# Patient Record
Sex: FEMALE | Race: BLACK OR AFRICAN AMERICAN | URBAN - METROPOLITAN AREA
[De-identification: names, ages, dates, MRNs, and addresses within clinical notes are randomized per-mention and may not be internally consistent; named-entity substitution may affect disease eponyms.]

---

## 2017-02-16 ENCOUNTER — OUTPATIENT (OUTPATIENT)
Dept: OUTPATIENT SERVICES | Facility: HOSPITAL | Age: 77
LOS: 1 days | Discharge: HOME | End: 2017-02-16

## 2017-02-16 LAB
ALBUMIN SERPL ELPH-MCNC: 3.9 G/DL — SIGNIFICANT CHANGE UP (ref 3.3–5)
ALP SERPL-CCNC: 100 U/L — SIGNIFICANT CHANGE UP (ref 40–120)
ALT FLD-CCNC: 21 U/L — SIGNIFICANT CHANGE UP (ref 4–33)
APPEARANCE UR: CLEAR — SIGNIFICANT CHANGE UP
AST SERPL-CCNC: 59 U/L — HIGH (ref 4–32)
BASOPHILS # BLD AUTO: 0.06 K/UL — SIGNIFICANT CHANGE UP (ref 0–0.2)
BASOPHILS NFR BLD AUTO: 1.8 % — SIGNIFICANT CHANGE UP (ref 0–2)
BILIRUB SERPL-MCNC: 0.2 MG/DL — SIGNIFICANT CHANGE UP (ref 0.2–1.2)
BILIRUB UR-MCNC: NEGATIVE — SIGNIFICANT CHANGE UP
BLOOD UR QL VISUAL: NEGATIVE — SIGNIFICANT CHANGE UP
BUN SERPL-MCNC: 10 MG/DL — SIGNIFICANT CHANGE UP (ref 7–23)
CALCIUM SERPL-MCNC: 9.1 MG/DL — SIGNIFICANT CHANGE UP (ref 8.4–10.5)
CHLORIDE SERPL-SCNC: 102 MMOL/L — SIGNIFICANT CHANGE UP (ref 98–107)
CHOLEST SERPL-MCNC: 215 MG/DL — HIGH (ref 120–199)
CO2 SERPL-SCNC: 24 MMOL/L — SIGNIFICANT CHANGE UP (ref 22–31)
COLOR SPEC: YELLOW — SIGNIFICANT CHANGE UP
CREAT SERPL-MCNC: 0.68 MG/DL — SIGNIFICANT CHANGE UP (ref 0.5–1.3)
EOSINOPHIL # BLD AUTO: 0.23 K/UL — SIGNIFICANT CHANGE UP (ref 0–0.5)
EOSINOPHIL NFR BLD AUTO: 6.9 % — HIGH (ref 0–6)
FOLATE SERPL-MCNC: > 20 NG/ML — HIGH (ref 4.7–20)
GLUCOSE SERPL-MCNC: 79 MG/DL — SIGNIFICANT CHANGE UP (ref 70–99)
GLUCOSE UR-MCNC: NEGATIVE — SIGNIFICANT CHANGE UP
HCT VFR BLD CALC: 34.3 % — LOW (ref 34.5–45)
HDLC SERPL-MCNC: 182 MG/DL — HIGH (ref 45–65)
HGB BLD-MCNC: 11.1 G/DL — LOW (ref 11.5–15.5)
IMM GRANULOCYTES NFR BLD AUTO: 0 % — SIGNIFICANT CHANGE UP (ref 0–1.5)
KETONES UR-MCNC: NEGATIVE — SIGNIFICANT CHANGE UP
LDH SERPL L TO P-CCNC: 238 U/L — HIGH (ref 135–225)
LEUKOCYTE ESTERASE UR-ACNC: NEGATIVE — SIGNIFICANT CHANGE UP
LYMPHOCYTES # BLD AUTO: 1.18 K/UL — SIGNIFICANT CHANGE UP (ref 1–3.3)
LYMPHOCYTES # BLD AUTO: 35.5 % — SIGNIFICANT CHANGE UP (ref 13–44)
MCHC RBC-ENTMCNC: 30.1 PG — SIGNIFICANT CHANGE UP (ref 27–34)
MCHC RBC-ENTMCNC: 32.4 % — SIGNIFICANT CHANGE UP (ref 32–36)
MCV RBC AUTO: 93 FL — SIGNIFICANT CHANGE UP (ref 80–100)
MONOCYTES # BLD AUTO: 0.36 K/UL — SIGNIFICANT CHANGE UP (ref 0–0.9)
MONOCYTES NFR BLD AUTO: 10.8 % — SIGNIFICANT CHANGE UP (ref 2–14)
MUCOUS THREADS # UR AUTO: SIGNIFICANT CHANGE UP
NEUTROPHILS # BLD AUTO: 1.49 K/UL — LOW (ref 1.8–7.4)
NEUTROPHILS NFR BLD AUTO: 45 % — SIGNIFICANT CHANGE UP (ref 43–77)
NITRITE UR-MCNC: NEGATIVE — SIGNIFICANT CHANGE UP
PH UR: 7.5 — SIGNIFICANT CHANGE UP (ref 4.6–8)
PLATELET # BLD AUTO: 254 K/UL — SIGNIFICANT CHANGE UP (ref 150–400)
PMV BLD: 10.6 FL — SIGNIFICANT CHANGE UP (ref 7–13)
POTASSIUM SERPL-MCNC: 4.4 MMOL/L — SIGNIFICANT CHANGE UP (ref 3.5–5.3)
POTASSIUM SERPL-SCNC: 4.4 MMOL/L — SIGNIFICANT CHANGE UP (ref 3.5–5.3)
PROT SERPL-MCNC: 6.5 G/DL — SIGNIFICANT CHANGE UP (ref 6–8.3)
PROT UR-MCNC: 10 — SIGNIFICANT CHANGE UP
RBC # BLD: 3.69 M/UL — LOW (ref 3.8–5.2)
RBC # FLD: 14.1 % — SIGNIFICANT CHANGE UP (ref 10.3–14.5)
RBC CASTS # UR COMP ASSIST: SIGNIFICANT CHANGE UP (ref 0–?)
SODIUM SERPL-SCNC: 138 MMOL/L — SIGNIFICANT CHANGE UP (ref 135–145)
SP GR SPEC: 1.02 — SIGNIFICANT CHANGE UP (ref 1–1.03)
SQUAMOUS # UR AUTO: SIGNIFICANT CHANGE UP
TRIGL SERPL-MCNC: 56 MG/DL — SIGNIFICANT CHANGE UP (ref 10–149)
TSH SERPL-MCNC: 0.69 UIU/ML — SIGNIFICANT CHANGE UP (ref 0.27–4.2)
UROBILINOGEN FLD QL: NORMAL E.U. — SIGNIFICANT CHANGE UP (ref 0.1–0.2)
VIT B12 SERPL-MCNC: 605 PG/ML — SIGNIFICANT CHANGE UP (ref 200–900)
WBC # BLD: 3.32 K/UL — LOW (ref 3.8–10.5)
WBC # FLD AUTO: 3.32 K/UL — LOW (ref 3.8–10.5)
WBC UR QL: SIGNIFICANT CHANGE UP (ref 0–?)

## 2017-02-17 DIAGNOSIS — F33.9 MAJOR DEPRESSIVE DISORDER, RECURRENT, UNSPECIFIED: ICD-10-CM

## 2017-05-25 ENCOUNTER — EMERGENCY (EMERGENCY)
Facility: HOSPITAL | Age: 77
LOS: 1 days | Discharge: HOME | End: 2017-05-25
Attending: EMERGENCY MEDICINE | Admitting: EMERGENCY MEDICINE
Payer: MEDICARE

## 2017-05-25 VITALS
HEART RATE: 67 BPM | TEMPERATURE: 98 F | OXYGEN SATURATION: 99 % | RESPIRATION RATE: 16 BRPM | DIASTOLIC BLOOD PRESSURE: 56 MMHG | SYSTOLIC BLOOD PRESSURE: 124 MMHG

## 2017-05-25 VITALS
DIASTOLIC BLOOD PRESSURE: 47 MMHG | SYSTOLIC BLOOD PRESSURE: 120 MMHG | TEMPERATURE: 98 F | RESPIRATION RATE: 16 BRPM | HEART RATE: 66 BPM | OXYGEN SATURATION: 100 %

## 2017-05-25 DIAGNOSIS — F33.9 MAJOR DEPRESSIVE DISORDER, RECURRENT, UNSPECIFIED: ICD-10-CM

## 2017-05-25 LAB
ALBUMIN SERPL ELPH-MCNC: 4.3 G/DL — SIGNIFICANT CHANGE UP (ref 3.3–5)
ALP SERPL-CCNC: 113 U/L — SIGNIFICANT CHANGE UP (ref 40–120)
ALT FLD-CCNC: 22 U/L — SIGNIFICANT CHANGE UP (ref 4–33)
AMPHET UR-MCNC: NEGATIVE — SIGNIFICANT CHANGE UP
APAP SERPL-MCNC: < 15 UG/ML — LOW (ref 15–25)
APPEARANCE UR: CLEAR — SIGNIFICANT CHANGE UP
AST SERPL-CCNC: 57 U/L — HIGH (ref 4–32)
BARBITURATES MEASUREMENT: NEGATIVE — SIGNIFICANT CHANGE UP
BARBITURATES UR SCN-MCNC: NEGATIVE — SIGNIFICANT CHANGE UP
BASOPHILS # BLD AUTO: 0.06 K/UL — SIGNIFICANT CHANGE UP (ref 0–0.2)
BASOPHILS NFR BLD AUTO: 1.1 % — SIGNIFICANT CHANGE UP (ref 0–2)
BENZODIAZ SERPL-MCNC: NEGATIVE — SIGNIFICANT CHANGE UP
BENZODIAZ UR-MCNC: NEGATIVE — SIGNIFICANT CHANGE UP
BILIRUB SERPL-MCNC: 0.2 MG/DL — SIGNIFICANT CHANGE UP (ref 0.2–1.2)
BILIRUB UR-MCNC: NEGATIVE — SIGNIFICANT CHANGE UP
BLOOD UR QL VISUAL: NEGATIVE — SIGNIFICANT CHANGE UP
BUN SERPL-MCNC: 22 MG/DL — SIGNIFICANT CHANGE UP (ref 7–23)
CALCIUM SERPL-MCNC: 9.9 MG/DL — SIGNIFICANT CHANGE UP (ref 8.4–10.5)
CANNABINOIDS UR-MCNC: NEGATIVE — SIGNIFICANT CHANGE UP
CHLORIDE SERPL-SCNC: 102 MMOL/L — SIGNIFICANT CHANGE UP (ref 98–107)
CO2 SERPL-SCNC: 24 MMOL/L — SIGNIFICANT CHANGE UP (ref 22–31)
COCAINE METAB.OTHER UR-MCNC: NEGATIVE — SIGNIFICANT CHANGE UP
COLOR SPEC: SIGNIFICANT CHANGE UP
CREAT SERPL-MCNC: 0.91 MG/DL — SIGNIFICANT CHANGE UP (ref 0.5–1.3)
EOSINOPHIL # BLD AUTO: 0.15 K/UL — SIGNIFICANT CHANGE UP (ref 0–0.5)
EOSINOPHIL NFR BLD AUTO: 2.6 % — SIGNIFICANT CHANGE UP (ref 0–6)
ETHANOL BLD-MCNC: 28 MG/DL — HIGH
GLUCOSE SERPL-MCNC: 116 MG/DL — HIGH (ref 70–99)
GLUCOSE UR-MCNC: NEGATIVE — SIGNIFICANT CHANGE UP
HCT VFR BLD CALC: 34.3 % — LOW (ref 34.5–45)
HGB BLD-MCNC: 11.2 G/DL — LOW (ref 11.5–15.5)
IMM GRANULOCYTES NFR BLD AUTO: 0.2 % — SIGNIFICANT CHANGE UP (ref 0–1.5)
KETONES UR-MCNC: NEGATIVE — SIGNIFICANT CHANGE UP
LEUKOCYTE ESTERASE UR-ACNC: NEGATIVE — SIGNIFICANT CHANGE UP
LYMPHOCYTES # BLD AUTO: 1.24 K/UL — SIGNIFICANT CHANGE UP (ref 1–3.3)
LYMPHOCYTES # BLD AUTO: 21.8 % — SIGNIFICANT CHANGE UP (ref 13–44)
MCHC RBC-ENTMCNC: 30.9 PG — SIGNIFICANT CHANGE UP (ref 27–34)
MCHC RBC-ENTMCNC: 32.7 % — SIGNIFICANT CHANGE UP (ref 32–36)
MCV RBC AUTO: 94.8 FL — SIGNIFICANT CHANGE UP (ref 80–100)
METHADONE UR-MCNC: NEGATIVE — SIGNIFICANT CHANGE UP
MONOCYTES # BLD AUTO: 0.21 K/UL — SIGNIFICANT CHANGE UP (ref 0–0.9)
MONOCYTES NFR BLD AUTO: 3.7 % — SIGNIFICANT CHANGE UP (ref 2–14)
NEUTROPHILS # BLD AUTO: 4.01 K/UL — SIGNIFICANT CHANGE UP (ref 1.8–7.4)
NEUTROPHILS NFR BLD AUTO: 70.6 % — SIGNIFICANT CHANGE UP (ref 43–77)
NITRITE UR-MCNC: NEGATIVE — SIGNIFICANT CHANGE UP
OPIATES UR-MCNC: NEGATIVE — SIGNIFICANT CHANGE UP
OXYCODONE UR-MCNC: NEGATIVE — SIGNIFICANT CHANGE UP
PCP UR-MCNC: NEGATIVE — SIGNIFICANT CHANGE UP
PH UR: 6 — SIGNIFICANT CHANGE UP (ref 4.6–8)
PLATELET # BLD AUTO: 252 K/UL — SIGNIFICANT CHANGE UP (ref 150–400)
PMV BLD: 10.6 FL — SIGNIFICANT CHANGE UP (ref 7–13)
POTASSIUM SERPL-MCNC: 3.9 MMOL/L — SIGNIFICANT CHANGE UP (ref 3.5–5.3)
POTASSIUM SERPL-SCNC: 3.9 MMOL/L — SIGNIFICANT CHANGE UP (ref 3.5–5.3)
PROT SERPL-MCNC: 6.9 G/DL — SIGNIFICANT CHANGE UP (ref 6–8.3)
PROT UR-MCNC: NEGATIVE — SIGNIFICANT CHANGE UP
RBC # BLD: 3.62 M/UL — LOW (ref 3.8–5.2)
RBC # FLD: 14.4 % — SIGNIFICANT CHANGE UP (ref 10.3–14.5)
SALICYLATES SERPL-MCNC: < 5 MG/DL — LOW (ref 15–30)
SODIUM SERPL-SCNC: 143 MMOL/L — SIGNIFICANT CHANGE UP (ref 135–145)
SP GR SPEC: 1.01 — SIGNIFICANT CHANGE UP (ref 1–1.03)
UROBILINOGEN FLD QL: NORMAL E.U. — SIGNIFICANT CHANGE UP (ref 0.1–0.2)
WBC # BLD: 5.68 K/UL — SIGNIFICANT CHANGE UP (ref 3.8–10.5)
WBC # FLD AUTO: 5.68 K/UL — SIGNIFICANT CHANGE UP (ref 3.8–10.5)

## 2017-05-25 PROCEDURE — 90792 PSYCH DIAG EVAL W/MED SRVCS: CPT

## 2017-05-25 PROCEDURE — 99284 EMERGENCY DEPT VISIT MOD MDM: CPT

## 2017-05-25 NOTE — ED BEHAVIORAL HEALTH ASSESSMENT NOTE - SUICIDE PROTECTIVE FACTORS
Identifies reasons for living/Future oriented/Responsibility to family and others/High spirituality/Supportive social network or family

## 2017-05-25 NOTE — ED BEHAVIORAL HEALTH ASSESSMENT NOTE - HPI (INCLUDE ILLNESS QUALITY, SEVERITY, DURATION, TIMING, CONTEXT, MODIFYING FACTORS, ASSOCIATED SIGNS AND SYMPTOMS)
77 y o  woman, , retired , domiciled with adult daughter, hx of depression 77 y o  woman, , retired , domiciled with adult daughter, hx of depression, one prior reported suicide attempt in which pt reports she was stopped from jumping from a building, several prior psychiatric hospitalizations, pmhs of HTN, hx liver abscess, osteoporosis, uses alcohol but denies to excess, no drug use; who is self referred after pt felt overwhelmed and hopeless earlier today due to several psychosocial stressors, had a drink of alcohol, and had thoughts of overdosing on medications.  On interview, pt is calm, pleasant, sociable. She denies suicidal ideations, intent or plan. 77 y o  woman, , retired , domiciled with adult daughter, hx of depression, one prior reported suicide attempt in which pt reports she was stopped from jumping from a building, several prior psychiatric hospitalizations, pmhs of HTN, hx liver abscess, osteoporosis, uses alcohol but denies to excess, no drug use; who is self referred after pt felt overwhelmed and hopeless earlier today due to several psychosocial stressors, had a drink of alcohol, and had thoughts of overdosing on medications.  On interview, pt is calm, pleasant, sociable. She denies suicidal ideations, intent or plan. She states she is stressed and overwhelmed by trying to sell her house. Today, she the cleaning lady used dish detergent in the , leading to a mess; and the hedges had not been cut; therefore the  was not able to show the house to buyers today. Patient felt stressed and "between a rock and a hard place," and drank a rum and coke, at which point she became even more upset and had thoughts of overdosing on medication.  Currently pt is future oriented and not suicidal. She states she would never complete suicide because she lives for her daughter and grandchildren. She belies the "hard knocks" of life and endorses chronic life long depression as a result, and chronic intermittent suicidal ideations.  She states she was hospitalized at [facility] in April, discharged earlier this month, and that she has an upcoming intake appt. on June 8 at [facility].   She uses alcohol, one to two drinks, occasionally and denies that it rises to the level of problem drinking. denies blackouts or dependence/tolerance/withdrawal (corroborated by collateral).  Patient denies homicidal ideations; denies manic or psychotic symptoms.   Patient reports being compliant with medications, including celexa.  See  note for collateral from daughter.

## 2017-05-25 NOTE — ED BEHAVIORAL HEALTH ASSESSMENT NOTE - RISK ASSESSMENT
low acute risk. not intoxicated, no acute suicidal ideation, future oriented, not manic or psychotic, willing to attend outpt tx    chronic risk: hx of one prior aborted suicide attempt, prior depression/hospitalizations, family hx of Suicide

## 2017-05-25 NOTE — ED ADULT NURSE REASSESSMENT NOTE - NS ED NURSE REASSESS COMMENT FT1
1830 Received pt in  from main ED, pt calm & cooperative c/o depression denies Si/Hi/AVh at present eval on going.

## 2017-05-25 NOTE — ED PROVIDER NOTE - OBJECTIVE STATEMENT
76 y/o F PMH depression with psychosis?, HTN c/o thoughts of suicide and "unable to cope" with life today. Pt states she was having problem after problem at home with housekeeping and "could not cope", drank 1.5 cups of rum and diet pepsi, and had thoughts of suicide with no plan. Pt was recently admitted to [place] ED for the past 3 weeks for psych. Pt messer snot follow with a psychiatrist as outpt currently. Denies fever, chills, CP, SOB, abdominal pain, N/V/D, dysuria, recreational drug use, HI, AH/VH.

## 2017-05-25 NOTE — ED BEHAVIORAL HEALTH ASSESSMENT NOTE - SUMMARY
77 y o  woman, , retired , domiciled with adult daughter, hx of depression, one prior reported suicide attempt in which pt reports she was stopped from jumping from a building, several prior psychiatric hospitalizations, pmhs of HTN, hx liver abscess, osteoporosis, uses alcohol but denies to excess, no drug use; who is self referred after pt felt overwhelmed and hopeless earlier today due to several psychosocial stressors, had a drink of alcohol, and had thoughts of overdosing on medications.  On interview pt is not suicidal or an acute danger. Collateral from daughter does not raise safety concerns. Patient does not require inpatient psychiatric hospitalization. She is appropriate for discharge and outpatient follow up.

## 2017-05-25 NOTE — ED BEHAVIORAL HEALTH NOTE - BEHAVIORAL HEALTH NOTE
Writer spoke with patient’s daughter, Asia Cummins, home [phone], cell [phone], on the phone, for collateral information. She reported the following:    Patient and the informant reside together. The patient has had many inpatient episodes, and was most recently discharged from [facility] a month ago. It is not known where her outpatient care will be provided. Today, the informant called 911 after the patient expressed suicidal ideation.    The patient has been eating and sleeping well, as well as tending to hygiene, but has been sad. Today she stated she was sad and tired, and overwhelmed due to her bills, the messy house, and the stress of trying to sell the house. She verbalized thoughts of taking all of her psychiatric medications to kill herself, and said she would take them if the informant agreed she should do so. She said the only thing she has to live for is the informant. The informant then told her to persevere, and that God is in control. The patient responded by saying God is not at all in control of her at this time. She attempted suicide once, years before at approximately age 42, juming off a roof, but how high this was is unknown. She has not expressed suicidal ideation since being admitted to [facility] 2 months ago, and the informant does not believe the patient is a danger to herself. This morning she purchased an alcoholic beverage, details unknown. She drinks when she is sad, but not to excess. Medical conditions, as well as medication-compliance, are unknown. She has not been aggressive toward others or property, nor verbalized thoughts of such behavior. There has been no evidence of psychosis.     Writer was informed patient has an intake to begin OP treatment at [facility], on Thursday, June 8. Writer also provided her with a brochure regarding the [facility] at [place] in case she feels the need to be seen again prior to her intake. Writer called [facility], [phone], speaking with Agusto, and requested [facility] service using account #[id number], at the request of the evaluating psychiatrist, for a fare of $30, to her address, verified to be: [address] [place] [address], with an ETA of 8:50PM.

## 2017-05-25 NOTE — ED ADULT NURSE NOTE - OBJECTIVE STATEMENT
Pt received to room 12. Pt states that she is having difficulty coping with life today and went to have a drink. Pt states she had one rum and coke earlier and came to the ED because she needs help coping with life. Initially patient stated she had SI with no plan, when re-evaluated by MD Gonzalez pt states she is not suicidal. Pt appears in no acute distress. Will continue to monitor. Pt received to room 12. Pt states that she is having difficulty coping with life today and went to have a drink. Pt states she had one rum and coke earlier and came to the ED because she needs help coping with life. Initially patient stated she had SI with no plan, when re-evaluated by MD Gonzalez pt states she is not suicidal, pt placed on 1:1 for SI. Pt appears in no acute distress. Will continue to monitor.

## 2017-05-25 NOTE — ED ADULT TRIAGE NOTE - CHIEF COMPLAINT QUOTE
Brought in from home for ETOH (last drink of rum and coke) and SI without a plan. Denies HI/drug use. PMH HTN/depression.

## 2017-05-25 NOTE — ED BEHAVIORAL HEALTH ASSESSMENT NOTE - DETAILS
pt states that 40 years ago, she was physically stopped from jumping from a high floor. denies other suicide attempts or self injurious behavior brother committed suicide self

## 2017-05-25 NOTE — ED PROVIDER NOTE - ATTENDING CONTRIBUTION TO CARE
ED Attending Dr. Goznalez: 76 yo female with HTN, depression (pt reports "psychosis" and medications confirm possible psychosis but unclear specific diagnosis), discharge from [facility] 3 weeks ago after admission for psychiatric reasons, in ED because "I can't cope".  Pt states that today she was arranging her house to sell it and she became overwhelmed because "nothing was going right".  Because of this pt went out to have an alcoholic drink, denies every day drinking, and then brought herself to ED.  No CP/SOB, N/V/D or abdominal pain.  On exam pt anxious appearing, heart RRR, lungs CTAB, abd NTND, extremities without swelling, strength 5/5 in all extremities and skin without rash.  I allowed pt to take her Zoloft dose in front of me before her medications and belongings were taken by security.  Pt states that she wants to kill herself but "never would because of my family".  Placed on 1:1 observation.

## 2017-05-25 NOTE — ED PROVIDER NOTE - MEDICAL DECISION MAKING DETAILS
76 y/o F with passive SI, no plan  -CO, cbc, cmp, tox screen, ua, if medically cleared will send to [facility]
